# Patient Record
Sex: FEMALE | Race: WHITE | NOT HISPANIC OR LATINO | URBAN - METROPOLITAN AREA
[De-identification: names, ages, dates, MRNs, and addresses within clinical notes are randomized per-mention and may not be internally consistent; named-entity substitution may affect disease eponyms.]

---

## 2017-03-05 ENCOUNTER — EMERGENCY (EMERGENCY)
Facility: HOSPITAL | Age: 62
LOS: 1 days | Discharge: HOME | End: 2017-03-05
Attending: EMERGENCY MEDICINE | Admitting: EMERGENCY MEDICINE
Payer: COMMERCIAL

## 2017-03-05 VITALS
SYSTOLIC BLOOD PRESSURE: 110 MMHG | TEMPERATURE: 98 F | HEART RATE: 72 BPM | RESPIRATION RATE: 18 BRPM | OXYGEN SATURATION: 98 % | DIASTOLIC BLOOD PRESSURE: 76 MMHG

## 2017-03-05 VITALS
HEART RATE: 68 BPM | SYSTOLIC BLOOD PRESSURE: 112 MMHG | TEMPERATURE: 98 F | DIASTOLIC BLOOD PRESSURE: 70 MMHG | RESPIRATION RATE: 18 BRPM | OXYGEN SATURATION: 98 %

## 2017-03-05 DIAGNOSIS — S01.81XA LACERATION WITHOUT FOREIGN BODY OF OTHER PART OF HEAD, INITIAL ENCOUNTER: ICD-10-CM

## 2017-03-05 DIAGNOSIS — E11.9 TYPE 2 DIABETES MELLITUS WITHOUT COMPLICATIONS: ICD-10-CM

## 2017-03-05 DIAGNOSIS — S00.33XA CONTUSION OF NOSE, INITIAL ENCOUNTER: ICD-10-CM

## 2017-03-05 PROCEDURE — 70450 CT HEAD/BRAIN W/O DYE: CPT | Mod: 26

## 2017-03-05 PROCEDURE — 99284 EMERGENCY DEPT VISIT MOD MDM: CPT

## 2017-03-05 PROCEDURE — 70486 CT MAXILLOFACIAL W/O DYE: CPT | Mod: 26

## 2017-03-05 RX ORDER — TRAMADOL HYDROCHLORIDE 50 MG/1
50 TABLET ORAL ONCE
Qty: 0 | Refills: 0 | Status: DISCONTINUED | OUTPATIENT
Start: 2017-03-05 | End: 2017-03-05

## 2017-03-05 RX ORDER — TETANUS TOXOID, REDUCED DIPHTHERIA TOXOID AND ACELLULAR PERTUSSIS VACCINE, ADSORBED 5; 2.5; 8; 8; 2.5 [IU]/.5ML; [IU]/.5ML; UG/.5ML; UG/.5ML; UG/.5ML
0.5 SUSPENSION INTRAMUSCULAR ONCE
Qty: 0 | Refills: 0 | Status: COMPLETED | OUTPATIENT
Start: 2017-03-05 | End: 2017-03-05

## 2017-03-05 RX ADMIN — TETANUS TOXOID, REDUCED DIPHTHERIA TOXOID AND ACELLULAR PERTUSSIS VACCINE, ADSORBED 0.5 MILLILITER(S): 5; 2.5; 8; 8; 2.5 SUSPENSION INTRAMUSCULAR at 11:45

## 2017-03-05 RX ADMIN — TRAMADOL HYDROCHLORIDE 50 MILLIGRAM(S): 50 TABLET ORAL at 11:27

## 2017-03-05 NOTE — ED PROVIDER NOTE - ENMT, MLM
Airway patent, +edema, erythema and ecchymosis TTP to the nosebridge region with slight crepitus, no active bleeding currently, no septal hematoma or perforation, Mouth with normal mucosa. Throat has no vesicles, no oropharyngeal exudates and uvula is midline.

## 2017-03-05 NOTE — ED ADULT TRIAGE NOTE - CHIEF COMPLAINT QUOTE
Pt was running outside and had a mechanical fall onto her face. Denies LOC. Laceration to forehead. Needs tetanus.

## 2017-03-05 NOTE — ED PROVIDER NOTE - OBJECTIVE STATEMENT
62 yo F with PMHx of DM, on baby ASA daily, presenting c/o laceration to the mid forehead region s/p fall today and mild pain and swelling to the nasal bridge. Pt was going for her morning run, tripped and fell face forward with her glasses on, sustained laceration to the mid forehead region with active bleeding.  Denies LOC, paresthesia, numbness, tingling, redness, change in vision, diplopia, d/c, HA, dizziness, SOB, CP, palpitations, N/V, focal weakness, and malaise. Pt is ambulatory s/p fall.  Last tetanus unknown

## 2017-03-05 NOTE — ED PROVIDER NOTE - SKIN, MLM
3x4 cm stellate full thickness laceration to the mid facial region with mild active bleeding, no d/c, bony exposure, or FB noted

## 2017-03-05 NOTE — ED PROVIDER NOTE - ATTENDING CONTRIBUTION TO CARE
I have personally seen and examined this patient. I have fully participated in the care of this patient. I have reviewed all pertinent clinical information, including history, physical exam and plan.

## 2017-03-05 NOTE — ED PROVIDER NOTE - CARE PLAN
Principal Discharge DX:	Facial laceration, initial encounter  Secondary Diagnosis:	Fall, initial encounter Principal Discharge DX:	Facial laceration, initial encounter  Secondary Diagnosis:	Fall, initial encounter  Secondary Diagnosis:	Contusion of nose, initial encounter

## 2017-03-05 NOTE — ED PROVIDER NOTE - MEDICAL DECISION MAKING DETAILS
pt with complex laceration to the facial region s/p mechanical fall, no focal neuro deficits on exam, CT with no acute fx, wound copiously irrigated under high pressure and repaired at bedside by Dr. Betancourt/plastic, NV intact pre and post lac repair, wound care instructions provided, instructed to f/u with Dr. Betancourt for wound check